# Patient Record
Sex: MALE | Race: AMERICAN INDIAN OR ALASKA NATIVE
[De-identification: names, ages, dates, MRNs, and addresses within clinical notes are randomized per-mention and may not be internally consistent; named-entity substitution may affect disease eponyms.]

---

## 2020-06-01 ENCOUNTER — HOSPITAL ENCOUNTER (EMERGENCY)
Dept: HOSPITAL 5 - ED | Age: 55
LOS: 1 days | Discharge: HOME | End: 2020-06-02
Payer: COMMERCIAL

## 2020-06-01 VITALS — DIASTOLIC BLOOD PRESSURE: 106 MMHG | SYSTOLIC BLOOD PRESSURE: 191 MMHG

## 2020-06-01 DIAGNOSIS — Z79.899: ICD-10-CM
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
## 2020-06-01 DIAGNOSIS — I10: ICD-10-CM
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
## 2020-06-01 DIAGNOSIS — S61.216A: ICD-10-CM
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
## 2020-06-01 DIAGNOSIS — S62.636A: Primary | ICD-10-CM

## 2020-06-01 DIAGNOSIS — Z98.890: ICD-10-CM

## 2020-06-01 DIAGNOSIS — Z91.14: ICD-10-CM

## 2020-06-01 NOTE — XRAY REPORT
Right fingers 3 views



INDICATION: Right finger pain following injury



IMPRESSION: Large laceration overlying the left little finger. There is fracture lucency through the 
distal tuft of the fifth finger distal phalanx.



Signer Name: Silvestre Rhoades MD 

Signed: 6/1/2020 11:00 PM

Workstation Name: [id number]

## 2020-06-01 NOTE — EMERGENCY DEPARTMENT REPORT
HPI





- General


Chief Complaint: Wound/Laceration


Time Seen by Provider: 06/01/20 22:01





- HPI


HPI: 





54-year-old male presents to the emergency department with a complaint of a 

laceration and pain to the right pinky, in this right-hand-dominant individual, 

when it got slammed in a garage door that was not attached to the motor.  

Patient went to an urgent care and had an x-ray done but did not find out the 

results of the x-ray.  They found the patient had extremely elevated blood 

pressure and sent him to the emergency department.  The patient does have a 

history of hypertension for which he is on unnamed blood pressure medications 

but has been out for the past 2 weeks.  He denies any headache, vision change, 

chest pain, shortness of breath.





ED Past Medical Hx





- Past Medical History


Previous Medical History?: Yes


Hx Hypertension: Yes





- Surgical History


Past Surgical History?: Yes


Additional Surgical History: back and neck.  left eye.  right ankle.  right leg.





- Social History


Smoking Status: Never Smoker


Substance Use Type: None





- Medications


Home Medications: 


                                Home Medications











 Medication  Instructions  Recorded  Confirmed  Last Taken  Type


 


Sulfamethoxazole/Trimethoprim 1 each PO BID #14 tablet 06/02/20  Unknown Rx





[Bactrim DS TAB]     


 


amLODIPine 10 mg PO DAILY #30 tab 06/02/20  Unknown Rx














ED Review of Systems


ROS: 


Stated complaint: LACERATION TO RIGHT PINKY FINGER


Other details as noted in HPI





Comment: All other systems reviewed and negative


Constitutional: denies: chills, fever


Eyes: denies: eye pain, vision change


Respiratory: denies: shortness of breath


Cardiovascular: denies: chest pain, palpitations


Gastrointestinal: denies: abdominal pain, vomiting


Musculoskeletal: arthralgia.  denies: back pain


Skin: other (laceration).  denies: rash





Physical Exam





- Physical Exam


Vital Signs: 


                                   Vital Signs











  06/01/20 06/01/20 06/01/20





  19:53 22:13 22:19


 


Temperature 98.4 F  98.0 F


 


Pulse Rate 58 L 62 62


 


Respiratory 18  18





Rate   


 


Blood Pressure 209/106 212/108 


 


Blood Pressure   212/108





[Right]   


 


O2 Sat by Pulse 97  98





Oximetry   











Physical Exam: 





GENERAL: The patient is well-developed well-nourished.


HENT: Normocephalic.  Atraumatic.    Patient has moist mucous membranes.


EYES: Extraocular motions are intact.  


NECK: Supple. Trachea is midline.


CHEST/LUNGS: Clear to auscultation.  There is no respiratory distress noted.


HEART/CARDIOVASCULAR: Regular.  There is no tachycardia.  There is no murmur.


ABDOMEN: Abdomen is soft, nontender.  Patient has normal bowel sounds.  


SKIN: Skin is warm and dry.  There is a 2 cm laceration to the volar distal 

right pinky finger.  


NEURO: The patient is awake, alert, and oriented.  The patient is cooperative.  

The patient has no focal neurologic deficits.  Normal speech.


MUSCULOSKELETAL: There is tenderness to palpation to the distal right pinky 

finger where the patient has a laceration.  Capillary refill less than 2 

seconds.  Radial pulse +2/4 to the affected right hand/wrist.





ED Course


                                   Vital Signs











  06/01/20 06/01/20 06/01/20





  19:53 22:13 22:19


 


Temperature 98.4 F  98.0 F


 


Pulse Rate 58 L 62 62


 


Respiratory 18  18





Rate   


 


Blood Pressure 209/106 212/108 


 


Blood Pressure   212/108





[Right]   


 


O2 Sat by Pulse 97  98





Oximetry   














- Laceration /Wound Repair


  ** Right Hand


Wound Location: upper extremity (Right volar distal pinky finger)


Wound Length (cm): 2


Wound's Depth, Shape: linear


Wound Explored: no foreign body removed


Irrigated w/ Saline (ccs): 50


Anesthesia: 1% Lidocaine


Volume Anesthetic (ccs): 7 (Digital Block)


Wound Repaired With: sutures


Suture Size/Type: 5:0, proline


Number of Sutures: 8 (1 horizontal mattress, 7 simple interrupted)


Sterile Dressing Applied?: Yes





- Nerve Block


Consent Obtained: verbal consent


Time Out Performed: Yes


Local Anesthetic Used: Lidocaine 1%


Amount of anesthesia used: 7


Side: right


Nerve Blocks: digital (pinky)


Procedure Successful: Yes


Complications: none


Patient Tolerated Procedure: well





ED Medical Decision Making





- Radiology Data


Radiology results: image reviewed


interpreted by me: 





X-ray of the right pinky finger shows a tuft fracture.





- Medical Decision Making





Patient presents with a laceration to the right pinky finger after a garage door

 came down on it.  X-ray of the finger shows a tuft fracture.  No foreign bodies

 seen.  Patient was given a digital block with successful anesthesia of the 

pinky finger.  The laceration was repaired as per the procedure section.  He was

 placed in a splint with sterile dressing.  He will follow-up with an 

orthopedist and has been given a prescription for antibiotics.





Patient also presented with elevated blood pressure after being out of his 

medications for the past 2 weeks.  He was given some Catapres here with some 

improvement of his blood pressure.  We discussed keeping a blood pressure log.  

We discussed dietary changes and the patient has been given a prescription for 

amlodipine.


Critical Care Time: No


Critical care attestation.: 


If time is entered above; I have spent that time in minutes in the direct care 

of this critically ill patient, excluding procedure time.








ED Disposition


Clinical Impression: 


 Closed fracture of tuft of distal phalanx of finger, Noncompliance with 

medication regimen





Laceration of finger


Qualifiers:


 Encounter type: initial encounter Finger: little finger Damage to nail status: 

without damage Foreign body presence: without foreign body Laterality: right 

Qualified Code(s): S61.216A - Laceration without foreign body of right little 

finger without damage to nail, initial encounter





Hypertension


Qualifiers:


 Hypertension type: essential hypertension Qualified Code(s): I10 - Essential 

(primary) hypertension





Disposition: DC-01 TO HOME OR SELFCARE


Is pt being admited?: No


Condition: Stable


Instructions:  Suture Care (ED), Finger Fracture (ED), Finger Laceration (ED), 

Hypertension (ED)


Additional Instructions: 


Please remain in the splint until follow-up with an orthopedist.  I am giving 

you a referral for a local orthopedist, Dr. Oliveira, to follow-up regarding the 

tuft fracture of your finger.  You can clean the finger with soap and water but 

then make sure it remains dry afterwards.  The sutures will need to be removed 

in about 7 days.  Please monitor for any signs/symptoms of infection such as 

increased pain, increased swelling, surrounding redness, development of fever, 

or discharge of pus.  Take the antibiotics as prescribed.





I am also prescribing you a blood pressure medication called Norvasc/amlodipine.

  This medication is taken once per day, usually in the morning.  Try to stay 

away from foods that are high in salt and caffeinated products.  Keep a blood 

pressure log.





Return to the emergency department with any worsening of your symptoms or any 

acute distress.


Prescriptions: 


amLODIPine 10 mg PO DAILY #30 tab


Sulfamethoxazole/Trimethoprim [Bactrim DS TAB] 1 each PO BID #14 tablet


Referrals: 


ADDISON CHATMAN MD [Primary Care Provider] - 3-5 Days


MYRANDA OLIVEIRA MD [Staff Physician] - 3-5 Days


[facility] [Provider Group] - 3-5 Days


Time of Disposition: 00:03